# Patient Record
Sex: FEMALE | Race: WHITE
[De-identification: names, ages, dates, MRNs, and addresses within clinical notes are randomized per-mention and may not be internally consistent; named-entity substitution may affect disease eponyms.]

---

## 2017-06-11 ENCOUNTER — HOSPITAL ENCOUNTER (EMERGENCY)
Dept: HOSPITAL 11 - JP.ED | Age: 62
Discharge: HOME | End: 2017-06-11
Payer: MEDICAID

## 2017-06-11 VITALS — DIASTOLIC BLOOD PRESSURE: 95 MMHG | SYSTOLIC BLOOD PRESSURE: 140 MMHG

## 2017-06-11 DIAGNOSIS — Z88.2: ICD-10-CM

## 2017-06-11 DIAGNOSIS — S22.41XA: Primary | ICD-10-CM

## 2017-06-11 DIAGNOSIS — S80.11XA: ICD-10-CM

## 2017-06-11 DIAGNOSIS — S60.222A: ICD-10-CM
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
## 2017-06-11 DIAGNOSIS — Z90.710: ICD-10-CM

## 2017-06-11 DIAGNOSIS — S00.83XA: ICD-10-CM

## 2017-06-11 DIAGNOSIS — F17.210: ICD-10-CM

## 2017-06-11 PROCEDURE — 96374 THER/PROPH/DIAG INJ IV PUSH: CPT

## 2017-06-11 PROCEDURE — 80053 COMPREHEN METABOLIC PANEL: CPT

## 2017-06-11 PROCEDURE — 85025 COMPLETE CBC W/AUTO DIFF WBC: CPT

## 2017-06-11 PROCEDURE — 96361 HYDRATE IV INFUSION ADD-ON: CPT

## 2017-06-11 PROCEDURE — 73130 X-RAY EXAM OF HAND: CPT

## 2017-06-11 PROCEDURE — 71101 X-RAY EXAM UNILAT RIBS/CHEST: CPT

## 2017-06-11 PROCEDURE — 71250 CT THORAX DX C-: CPT

## 2017-06-11 PROCEDURE — 99285 EMERGENCY DEPT VISIT HI MDM: CPT

## 2017-06-11 PROCEDURE — 36415 COLL VENOUS BLD VENIPUNCTURE: CPT

## 2017-06-11 PROCEDURE — 73552 X-RAY EXAM OF FEMUR 2/>: CPT

## 2017-06-11 PROCEDURE — 70110 X-RAY EXAM OF JAW 4/> VIEWS: CPT
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
## 2017-09-18 ENCOUNTER — HOSPITAL ENCOUNTER (OUTPATIENT)
Dept: HOSPITAL 11 - JP.SDS | Age: 62
Discharge: HOME | End: 2017-09-18
Attending: SURGERY
Payer: MEDICAID

## 2017-09-18 VITALS — DIASTOLIC BLOOD PRESSURE: 68 MMHG | SYSTOLIC BLOOD PRESSURE: 136 MMHG

## 2017-09-18 DIAGNOSIS — E78.5: ICD-10-CM

## 2017-09-18 DIAGNOSIS — F17.210: ICD-10-CM

## 2017-09-18 DIAGNOSIS — Z90.710: ICD-10-CM

## 2017-09-18 DIAGNOSIS — K29.50: ICD-10-CM

## 2017-09-18 DIAGNOSIS — K21.9: ICD-10-CM

## 2017-09-18 DIAGNOSIS — Z98.890: ICD-10-CM

## 2017-09-18 DIAGNOSIS — F32.9: ICD-10-CM

## 2017-09-18 DIAGNOSIS — Z88.2: ICD-10-CM

## 2017-09-18 DIAGNOSIS — Z12.11: Primary | ICD-10-CM

## 2017-09-18 DIAGNOSIS — F41.9: ICD-10-CM

## 2017-09-18 PROCEDURE — 88342 IMHCHEM/IMCYTCHM 1ST ANTB: CPT

## 2017-09-18 PROCEDURE — 87081 CULTURE SCREEN ONLY: CPT

## 2017-09-18 PROCEDURE — 45378 DIAGNOSTIC COLONOSCOPY: CPT

## 2017-09-18 PROCEDURE — 43239 EGD BIOPSY SINGLE/MULTIPLE: CPT

## 2017-09-18 PROCEDURE — 88305 TISSUE EXAM BY PATHOLOGIST: CPT

## 2017-09-18 NOTE — OR
DATE OF PROCEDURE:  09/18/2017

 

PREOPERATIVE DIAGNOSES:  Epigastric pain, colon cancer screening.

 

POSTOPERATIVE DIAGNOSIS:  Epigastric pain, etiology unknown.  Unremarkable 
colonoscopy.

 

PROCEDURES:  Esophagogastroduodenoscopy with antral biopsies for CLOtest and 
for pathology

to look for Helicobacter pylori and colonoscopy to the cecum.



SURGEON:  Kwame Sadler MD. 

 

ANESTHESIA:  IV anesthesia with monitored anesthesia care.

 

INDICATIONS:  This 62-year-old white female is referred for upper and lower 
endoscopy.

Indication for upper endoscopy is epigastric pain.  Indication for colonoscopy 
is screening.

She does say that ten years ago at her last colonoscopy, she had polyps.  This 
was done

apparently in [place], [place].  I counseled her for upper and lower endoscopy 
with possible

biopsy and/or polypectomy, including risks and alternatives, and she gave her 
informed

consent to proceed.

 

DESCRIPTION OF PROCEDURE:  The patient was placed in the left lateral decubitus 
position.

IV anesthesia was administered by the Anesthesia Service.  Time-out was held.  
The flexible

fiberoptic upper endoscope was passed through her mouth, down her esophagus, 
and into her

stomach.  The scope was easily passed through the pylorus into the duodenal 
reaching its

third portion.  The scope was then slowly withdrawn, examining the mucosa 
throughout.  The

duodenal mucosa appeared unremarkable.  The scope was brought up to the 
pylorus.  The 

antrum appeared unremarkable.  The scope was retroflexed.  The proximal stomach 
appeared

unremarkable.  The scope was straightened.  We did obtain antral biopsies for 
CLOtest and

for pathology to look for Helicobacter pylori because she is on a proton-pump 
inhibitor,

which could mask Helicobacter pylori infection.  The scope was then brought up 
through the

GE junction.  This appeared unremarkable and up through the unremarkable 
appearing esophagus

and was removed.  Next, a rectal exam was performed, which was unremarkable.  
The flexible

video Olympus colonoscope was introduced through her anus, up her rectum, and 
out her colon

all the way to the cecum.  Once the cecum was reached, the scope was slowly 
withdrawn,

examining the mucosa throughout.  No mucosal abnormalities were noted.  The 
scope was

retroflexed in the rectum with the distal rectum appearing unremarkable.  The 
scope was

straightened and removed.  She tolerated the procedure well.

 

 

 

 

Kwame Sadler MD

DD:  09/18/2017 11:02:52

DT:  09/18/2017 12:13:30

Job #:  [id number]

[facility] CenterLETITIA

## 2018-03-01 ENCOUNTER — HOSPITAL ENCOUNTER (EMERGENCY)
Dept: HOSPITAL 11 - JP.ED | Age: 63
Discharge: HOME | End: 2018-03-01
Payer: MEDICAID

## 2018-03-01 VITALS — SYSTOLIC BLOOD PRESSURE: 153 MMHG | DIASTOLIC BLOOD PRESSURE: 76 MMHG

## 2018-03-01 DIAGNOSIS — F17.210: ICD-10-CM

## 2018-03-01 DIAGNOSIS — H66.001: Primary | ICD-10-CM

## 2018-03-01 DIAGNOSIS — Z88.2: ICD-10-CM

## 2018-03-01 DIAGNOSIS — Z79.899: ICD-10-CM

## 2018-03-01 DIAGNOSIS — K21.9: ICD-10-CM
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
## 2021-02-06 NOTE — CRLCT
Indication:



Fall. Pain.



Technique:



Multiple contiguous axial images were obtained from the skullbase to the 

vertex without intravenous contrast enhancement.



Please note that all CT scans at this facility use dose modulation, 

iterative reconstruction, and/or weight-based dosing when appropriate to 

reduce radiation dose to as low as reasonably achievable. 



Comparison:



None



Findings:



The ventricles are mildly enlarged. Ventricles are symmetric in size and 

morphology. No intra-axial or extra-axial hemorrhage is identified. No 

mass, mass effect or midline shift is seen. The bony calvarium is intact. 

The visualized paranasal sinuses and mastoid air cells are clear. Soft 

tissue swelling is identified along the posterior left vertex. No calvarial 

fracture is identified. No underlying hemorrhage is seen.



Impression:



No acute intracranial process.



Mild diffuse volume loss.



Please note that all CT scans at this facility use dose modulation, 

iterative reconstruction, and/or weight-based dosing when appropriate to 

reduce radiation dose to as low as reasonably achievable.



Dictated by Sushila Perry MD @ Feb 6 2021 12:04PM



Signed by Dr. Sushila Perry @ Feb 6 2021 12:06PM

## 2021-02-06 NOTE — CRLCT
INDICATION:



Fall. Pain.



TECHNIQUE:



Noncontrast CT scan of the cervical spine with re-formatted images 

obtained.



FINDINGS:



No evidence of acute fracture or dislocation of the cervical spine. 

Discectomy and fusion changes at C5-7 with anterior stabilization hardware 

in place all of which appears intact.



Biapical pleural-parenchymal scarring in the lungs. No other bony or soft 

tissue abnormalities identified.



IMPRESSION:



No evidence of acute fracture or dislocation of the cervical spine.



Dictated by Juan Matute MD @ 2/6/2021 12:23:51 PM



Please note that all CT scans at this facility use dose modulation, 

iterative reconstruction, and/or weight-based dosing when appropriate to 

reduce radiation dose to as low as reasonably achievable.



Dictated by: Juan Matute MD @ 02/06/2021 12:23:59



(Electronically Signed)

## 2021-02-08 NOTE — CR
CHEST: 2 view

 

CLINICAL HISTORY:Fall, pain

 

COMPARISON:CT 2017

 

FINDINGS:  Lungs are emphysematous. There is a pectus deformity. There are some

minimal endplate compression deformities throughout the thoracic spine there is

a deformity of the left ninth posterior lateral rib. This may be from previous

fracture. Chronology is uncertain.

 

Impression: Minimal left ninth rib deformity of uncertain chronology.

 

No acute cardiac pulmonary process

 

Emphysematous changes

 

Pectus excavatum

## 2021-02-08 NOTE — CR
Hip Min 2V 

 

 

CLINICAL HISTORY: Fall, pain

 

FINDINGS: Agent has had previous open reduction of a femoral fracture with

fixation screws. No acute fracture or dislocation identified. Visualized pelvis

appears intact.

 

IMPRESSION: Previous open reduction left hip fracture

 

No acute fracture

 

 

 

 Lt, Knee 3V Lt

 

CLINICAL HISTORY: Fall, pain

 

FINDINGS: No acute fracture or dislocation is noted. There are no osseous

lesions. Patient has a had previous the surgery in the proximal tibia from the

lateral approach. There are fixation screws and staple. There is moderate joint

space narrowing. There is prominence of the intercondylar eminence. There is

periarticular spurring.

 

Impression: Previous lateral tibial surgery

 

Moderate osteoarthritis

 

No acute fracture